# Patient Record
Sex: FEMALE | Race: WHITE | NOT HISPANIC OR LATINO | URBAN - NONMETROPOLITAN AREA
[De-identification: names, ages, dates, MRNs, and addresses within clinical notes are randomized per-mention and may not be internally consistent; named-entity substitution may affect disease eponyms.]

---

## 2019-09-03 ENCOUNTER — IMPORTED ENCOUNTER (OUTPATIENT)
Dept: URBAN - NONMETROPOLITAN AREA CLINIC 1 | Facility: CLINIC | Age: 74
End: 2019-09-03

## 2019-09-03 PROCEDURE — 92014 COMPRE OPH EXAM EST PT 1/>: CPT

## 2019-09-03 NOTE — PATIENT DISCUSSION
Cataract OU +PROGRESSION-Not yet surgical. -Reviewed symptoms of advancing cataract growth such as glare and halos and decreased vision.-Continue to monitor for now. Pt will notify us if any new symptoms develop.

## 2020-10-08 ENCOUNTER — IMPORTED ENCOUNTER (OUTPATIENT)
Dept: URBAN - NONMETROPOLITAN AREA CLINIC 1 | Facility: CLINIC | Age: 75
End: 2020-10-08

## 2020-10-08 PROCEDURE — 92014 COMPRE OPH EXAM EST PT 1/>: CPT

## 2020-10-08 PROCEDURE — 92015 DETERMINE REFRACTIVE STATE: CPT

## 2020-10-08 NOTE — PATIENT DISCUSSION
Cataract OU +PROGRESSION-Not yet surgical. -Reviewed symptoms of advancing cataract growth such as glare and halos and decreased vision.-Continue to monitor for now. Pt will notify us if any new symptoms develop. Hyperopia-Discussed diagnosis with patient. Presbyopia-Discussed diagnosis with patient. Updated spec Rx given. Recommend lens that will provide comfort as well as protect safety and health of eyes.

## 2021-11-01 ENCOUNTER — IMPORTED ENCOUNTER (OUTPATIENT)
Dept: URBAN - NONMETROPOLITAN AREA CLINIC 1 | Facility: CLINIC | Age: 76
End: 2021-11-01

## 2021-11-01 PROCEDURE — 92014 COMPRE OPH EXAM EST PT 1/>: CPT

## 2021-11-01 NOTE — PATIENT DISCUSSION
Cataract(s)-Visually significant.-Cataract(s) causing symptomatic impairment of visual function not correctable with a tolerable change in glasses or contact lenses lighting or non-operative means resulting in specific activity limitations and/or participation restrictions including but not limited to reading viewing television driving or meeting vocational or recreational needs. -Expectation is clearer vision and reduced glare disability after cataract removal.-Refer to Dr Goran Morrison for cataract evaluationPT TO CALL WHEN READYHyperopia-Discussed diagnosis with patient. Astigmatism-Discussed diagnosis with patient. Presbyopia-Discussed diagnosis with patient. Updated spec Rx given. Recommend lens that will provide comfort as well as protect safety and health of eyes.

## 2022-04-09 ASSESSMENT — TONOMETRY
OS_IOP_MMHG: 17
OS_IOP_MMHG: 16
OS_IOP_MMHG: 17
OD_IOP_MMHG: 17
OD_IOP_MMHG: 16
OD_IOP_MMHG: 16

## 2022-04-09 ASSESSMENT — VISUAL ACUITY
OS_SC: 20/40
OS_SC: 20/40
OD_CC: J1
OD_SC: 20/40
OD_SC: 20/30
OS_CC: J1
OU_CC: J1
OD_CC: J1
OD_SC: 20/40
OS_SC: 20/40
OS_CC: J1

## 2022-11-07 ENCOUNTER — ESTABLISHED PATIENT (OUTPATIENT)
Dept: RURAL CLINIC 1 | Facility: CLINIC | Age: 77
End: 2022-11-07

## 2022-11-07 DIAGNOSIS — H25.13: ICD-10-CM

## 2022-11-07 PROCEDURE — 92014 COMPRE OPH EXAM EST PT 1/>: CPT

## 2022-11-07 ASSESSMENT — VISUAL ACUITY
OD_BAT: 20/50-1
OD_PH: 20/40-1
OS_BAT: 20/60
OS_CC: 20/50-1
OD_CC: 20/50-1
OS_PH: 20/30-1

## 2022-11-07 ASSESSMENT — TONOMETRY
OD_IOP_MMHG: 15
OS_IOP_MMHG: 14

## 2023-01-11 ENCOUNTER — CONSULTATION/EVALUATION (OUTPATIENT)
Dept: RURAL CLINIC 1 | Facility: CLINIC | Age: 78
End: 2023-01-11

## 2023-01-11 DIAGNOSIS — H25.13: ICD-10-CM

## 2023-01-11 PROCEDURE — 92014 COMPRE OPH EXAM EST PT 1/>: CPT

## 2023-01-11 ASSESSMENT — VISUAL ACUITY
OD_SC: 20/70-2
OS_CC: 20/30
OS_AM: 20/30
OS_CC: 20/50-1
OD_PAM: 20/30
OD_PH: 20/50+2
OS_BAT: 20/400
OU_SC: 20/70-1
OU_CC: 20/40
OS_PH: 20/50+1
OS_SC: 20/70-2
OD_CC: 20/30
OD_CC: 20/50-1
OU_CC: 20/30

## 2023-01-11 ASSESSMENT — TONOMETRY
OS_IOP_MMHG: 14
OD_IOP_MMHG: 15

## 2023-04-12 ENCOUNTER — POST-OP (OUTPATIENT)
Dept: RURAL CLINIC 1 | Facility: CLINIC | Age: 78
End: 2023-04-12

## 2023-04-12 DIAGNOSIS — Z96.1: ICD-10-CM

## 2023-04-12 DIAGNOSIS — H52.4: ICD-10-CM

## 2023-04-12 PROCEDURE — 99024 POSTOP FOLLOW-UP VISIT: CPT

## 2023-04-12 ASSESSMENT — VISUAL ACUITY
OS_SC: 20/50
OD_SC: 20/70

## 2023-08-16 ENCOUNTER — FOLLOW UP (OUTPATIENT)
Dept: RURAL CLINIC 1 | Facility: CLINIC | Age: 78
End: 2023-08-16

## 2023-08-16 DIAGNOSIS — H26.493: ICD-10-CM

## 2023-08-16 DIAGNOSIS — Z96.1: ICD-10-CM

## 2023-08-16 PROCEDURE — 92014 COMPRE OPH EXAM EST PT 1/>: CPT

## 2023-08-16 ASSESSMENT — VISUAL ACUITY
OS_CC: 20/40
OD_CC: 20/40

## 2023-08-16 ASSESSMENT — TONOMETRY
OD_IOP_MMHG: 14
OS_IOP_MMHG: 14

## 2024-08-19 ENCOUNTER — COMPREHENSIVE EXAM (OUTPATIENT)
Dept: RURAL CLINIC 1 | Facility: CLINIC | Age: 79
End: 2024-08-19

## 2024-08-19 DIAGNOSIS — Z96.1: ICD-10-CM

## 2024-08-19 DIAGNOSIS — H26.493: ICD-10-CM

## 2024-08-19 PROCEDURE — 92014 COMPRE OPH EXAM EST PT 1/>: CPT

## 2024-08-19 ASSESSMENT — VISUAL ACUITY
OD_CC: 20/40-1
OU_CC: 20/30-1
OU_CC: 20/40-1
OD_CC: 20/30-2
OS_CC: 20/50
OS_CC: 20/40-1

## 2024-08-19 ASSESSMENT — TONOMETRY
OD_IOP_MMHG: 8
OS_IOP_MMHG: 10

## 2024-08-29 ENCOUNTER — CONSULTATION/EVALUATION (OUTPATIENT)
Dept: RURAL CLINIC 1 | Facility: CLINIC | Age: 79
End: 2024-08-29

## 2024-08-29 DIAGNOSIS — H26.492: ICD-10-CM

## 2024-08-29 PROCEDURE — 92014 COMPRE OPH EXAM EST PT 1/>: CPT

## 2024-08-29 PROCEDURE — 66821 AFTER CATARACT LASER SURGERY: CPT

## 2024-08-29 ASSESSMENT — VISUAL ACUITY
OS_PH: 20/50
OS_AM: 20/80
OS_CC: 20/50
OD_CC: 20/50
OD_PAM: 20/70
OS_SC: 20/80-1
OD_PH: 20/40
OD_SC: 20/80-2

## 2024-08-29 ASSESSMENT — TONOMETRY
OS_IOP_MMHG: 12
OD_IOP_MMHG: 12

## 2024-09-12 ENCOUNTER — POST-OP (OUTPATIENT)
Dept: RURAL CLINIC 1 | Facility: CLINIC | Age: 79
End: 2024-09-12

## 2024-09-12 DIAGNOSIS — H26.491: ICD-10-CM

## 2024-09-12 DIAGNOSIS — Z98.890: ICD-10-CM

## 2024-09-12 PROCEDURE — 99212 OFFICE O/P EST SF 10 MIN: CPT | Mod: 79

## 2024-09-12 PROCEDURE — 66821 AFTER CATARACT LASER SURGERY: CPT | Mod: 25,RT,79,RT

## 2024-09-27 ENCOUNTER — FOLLOW UP (OUTPATIENT)
Dept: RURAL CLINIC 1 | Facility: CLINIC | Age: 79
End: 2024-09-27

## 2024-09-27 DIAGNOSIS — Z98.890: ICD-10-CM

## 2024-09-27 PROCEDURE — 99024 POSTOP FOLLOW-UP VISIT: CPT
